# Patient Record
Sex: FEMALE | Race: WHITE
[De-identification: names, ages, dates, MRNs, and addresses within clinical notes are randomized per-mention and may not be internally consistent; named-entity substitution may affect disease eponyms.]

---

## 2020-10-14 ENCOUNTER — HOSPITAL ENCOUNTER (EMERGENCY)
Age: 45
LOS: 1 days | Discharge: HOME | End: 2020-10-15
Payer: COMMERCIAL

## 2020-10-14 VITALS
DIASTOLIC BLOOD PRESSURE: 44 MMHG | TEMPERATURE: 101.66 F | RESPIRATION RATE: 21 BRPM | HEART RATE: 150 BPM | SYSTOLIC BLOOD PRESSURE: 100 MMHG | OXYGEN SATURATION: 100 %

## 2020-10-14 DIAGNOSIS — M54.9: ICD-10-CM

## 2020-10-14 DIAGNOSIS — R30.0: ICD-10-CM

## 2020-10-14 DIAGNOSIS — R39.15: ICD-10-CM

## 2020-10-14 DIAGNOSIS — R50.9: ICD-10-CM

## 2020-10-14 DIAGNOSIS — N12: Primary | ICD-10-CM

## 2020-10-14 DIAGNOSIS — R00.0: ICD-10-CM

## 2020-10-14 LAB
ADD MANUAL DIFF / SLIDE REVIEW: NO
ALBUMIN SERPL-MCNC: 4.5 G/DL (ref 3.5–5)
ALBUMIN/GLOB SERPL: 1.4 {RATIO} (ref 1–2.8)
ALP SERPL-CCNC: 59 U/L (ref 38–126)
ALT SERPL-CCNC: 82 IU/L (ref ?–35)
BILIRUBIN URINE UA: NEGATIVE
BUN SERPL-MCNC: 11 MG/DL (ref 7–17)
CALCIUM SERPL-MCNC: 9.3 MG/DL (ref 8.4–10.2)
CHLORIDE SERPL-SCNC: 105 MMOL/L (ref 98–107)
CO2 SERPL-SCNC: 26 MMOL/L (ref 22–32)
COLOR UR: YELLOW
ESTIMATED GLOMERULAR FILT RATE: > 60 ML/MIN (ref 60–?)
GLOBULIN SER CALC-MCNC: 3.3 G/DL (ref 1.7–4.1)
GLUCOSE SERPL-MCNC: 113 MG/DL (ref 70–100)
GLUCOSE URINE UA: NEGATIVE G/DL
HEMATOCRIT: 38 % (ref 36–46)
HEMOGLOBIN: 13 G/DL (ref 12–16)
HEMOLYSIS: < 15 (ref 0–50)
HGB UR QL: (no result)
KETONES URINE UA: NEGATIVE
LACTATE SERPL-MCNC: 1.1 MMOL/L (ref 0.7–2.1)
LEUKOCYTE ESTERASE URINE UA: (no result)
LIPASE SERPL-CCNC: 94 U/L (ref 23–300)
LYMPHOCYTES # SPEC AUTO: 1600 /UL (ref 1100–4500)
MCV RBC: 91.6 FL (ref 80–100)
MEAN CORPUSCULAR HEMOGLOBIN: 31.4 PG (ref 26–34)
MEAN CORPUSCULAR HGB CONC: 34.3 % (ref 30–36)
NITRITE URINE UA: NEGATIVE
PH UR: 5 [PH] (ref 4.5–8)
PLATELET COUNT: 183 X10^3/UL (ref 150–400)
POTASSIUM SERPL-SCNC: 3.7 MMOL/L (ref 3.4–5.1)
PROT SERPL-MCNC: 7.8 G/DL (ref 6.3–8.2)
PROTEIN URINE UA: (no result)
SODIUM SERPL-SCNC: 138 MMOL/L (ref 137–145)
SP GR UR: 1.02 (ref 1–1.03)
URINE COMMENTS: (no result)
UROBILINOGEN UR QL: 0.2 E.U./DL

## 2020-10-14 PROCEDURE — 99284 EMERGENCY DEPT VISIT MOD MDM: CPT

## 2020-10-14 PROCEDURE — 80053 COMPREHEN METABOLIC PANEL: CPT

## 2020-10-14 PROCEDURE — 96365 THER/PROPH/DIAG IV INF INIT: CPT

## 2020-10-14 PROCEDURE — 36415 COLL VENOUS BLD VENIPUNCTURE: CPT

## 2020-10-14 PROCEDURE — 87086 URINE CULTURE/COLONY COUNT: CPT

## 2020-10-14 PROCEDURE — 81001 URINALYSIS AUTO W/SCOPE: CPT

## 2020-10-14 PROCEDURE — 87186 SC STD MICRODIL/AGAR DIL: CPT

## 2020-10-14 PROCEDURE — 81025 URINE PREGNANCY TEST: CPT

## 2020-10-14 PROCEDURE — 83690 ASSAY OF LIPASE: CPT

## 2020-10-14 PROCEDURE — 87040 BLOOD CULTURE FOR BACTERIA: CPT

## 2020-10-14 PROCEDURE — 84145 PROCALCITONIN (PCT): CPT

## 2020-10-14 PROCEDURE — 83605 ASSAY OF LACTIC ACID: CPT

## 2020-10-14 PROCEDURE — 96361 HYDRATE IV INFUSION ADD-ON: CPT

## 2020-10-14 PROCEDURE — 85025 COMPLETE CBC W/AUTO DIFF WBC: CPT

## 2020-10-14 PROCEDURE — 93005 ELECTROCARDIOGRAM TRACING: CPT

## 2020-10-14 PROCEDURE — 87077 CULTURE AEROBIC IDENTIFY: CPT

## 2020-10-14 NOTE — ED_ITS
"HPI - General Adult    
General    
Chief complaint: Fever    
Stated complaint: had UTI, back pain, chills    
Time Seen by Provider: 10/14/20 23:17    
Source: patient    
Mode of arrival: Ambulatory    
Limitations: no limitations    
History of Present Illness    
HPI narrative: 45-year-old female who has had UTI like symptoms for   
approximately 10 days here for evaluation of just several hours of a fairly   
sudden onset of chills and body aches and shaking.  She continues to have   
dysuria.  She states she has had symptoms like this in the past been normal she   
self medicates at home with over-the-counter medicines and her symptoms seemed   
to improve.  That has not been happening this time.  He also has right-sided   
back pain.  She did take some Tylenol and ibuprofen and feels that the back pain  
has improved somewhat.  Her chills have improved somewhat but she still feels   
fevers.  She denies any bowel symptoms or vaginal symptoms.  No nausea or   
vomiting.    
Related Data    
                                  Previous Rx's    
    
    
    
 Medication  Instructions  Recorded    
     
ondansetron 4 mg PO Q6H PRN #10 tab 10/15/20    
     
sulfamethoxazole-trimethoprim 1 tab PO BID 14 Days #28 tab 10/15/20    
    
[Bactrim DS]      
    
    
    
                                    Allergies    
    
    
    
Allergy/AdvReac Type Severity Reaction Status Date / Time    
     
No Known Drug Allergies Allergy   Verified 10/14/20 23:46    
    
    
    
    
Review of Systems    
Constitutional    
Constitutional: Reports chills, Reports fever(s) and Reports malaise    
Cardiovascular    
Cardiovascular: Denies chest pain and Denies dyspnea    
Respiratory    
Respiratory: Denies dyspnea    
Gastrointestinal    
Gastrointestinal: Denies abdominal pain, Denies nausea and Denies vomiting    
Genitourinary    
Genitourinary: Reports dysuria, Reports urinary hesitancy and Reports urinary   
urgency    
Genitourinary: Reports dysuria, Reports urinary hesitancy, Reports urinary   
urgency and Denies vaginal discharge    
Musculoskeletal    
Musculoskeletal: Denies arthralgias and Denies myalgias    
Integumentary/Breasts    
Skin/Breast: Denies rash    
Neurologic    
Neurologic: Denies behavioral changes    
Psychiatric    
Psychiatric: Denies behavioral changes    
Hematologic/Lymphatic    
Hematologic/Lymphatic: Denies easy bleeding and Denies easy bruising    
Allergic/Immunologic    
Allergic/Immunologic: Denies urticaria    
    
Patient History    
Medical History (Reviewed 10/15/20 @ 01:25 by En Lunsford DO)    
    
Healthy adult (Acute)    
    
    
Social History (Reviewed 10/15/20 @ 01:25 by En Lunsford DO)    
Smoking Status:  Current every day smoker     
    
    
Smoking Status: Current every day smoker    
alcohol intake frequency: a few times a month    
Substance Use Type: does not use    
    
Exam    
Initial Vital Signs    
Initial Vital Signs: Vital Signs    
    
    
    
Temperature  101.7 F H  10/14/20 23:15    
     
Pulse Rate  150 H  10/14/20 23:15    
     
Respiratory Rate  21   10/14/20 23:15    
     
Blood Pressure  100/44 L  10/14/20 23:15    
     
Pulse Oximetry  100   10/14/20 23:15    
    
    
    
Const    
General: cooperative and comfortable    
Limitations: mental status not altered    
HENMT    
Head: normal to inspection and normocephalic    
Resp    
Effort & Inspection: normal respiratory effort    
Auscultation: clear to auscultation bilaterally    
Cardio    
Rate: tachycardic    
Rhythm: regular rhythm    
Pulses: radial pulses present    
GI    
Inspection: non-distended    
Palpation: soft    
Back/Spine/Pelvis    
Back: CVA tenderness right    
Skin    
Lesions: no lesions    
Rashes: no rashes    
Neuro    
General: patient alert, patient awake and patient oriented x3    
Cognition: normal cognition    
Speech: speech 
[id number]|2020-10-14 23:30:57|2020-10-14 23:30:57|ED.GENADULT||||"HPI - General Adult

## 2020-10-14 NOTE — PC.NURSE
Patient reports noticing burning, urgency and frequency about on week ago. Thought she was on the mend but began to have flank pain which was manageable. Today started getting rigors/chills and fever.

## 2020-10-15 VITALS — DIASTOLIC BLOOD PRESSURE: 53 MMHG | HEART RATE: 110 BPM | OXYGEN SATURATION: 99 % | SYSTOLIC BLOOD PRESSURE: 109 MMHG

## 2020-10-15 VITALS
OXYGEN SATURATION: 99 % | DIASTOLIC BLOOD PRESSURE: 55 MMHG | HEART RATE: 111 BPM | TEMPERATURE: 99.3 F | SYSTOLIC BLOOD PRESSURE: 111 MMHG

## 2020-10-15 VITALS — TEMPERATURE: 99.32 F

## 2020-10-15 LAB — PROCALCITONIN: 0.06 NG/ML (ref ?–0.5)

## 2021-11-17 ENCOUNTER — HOSPITAL ENCOUNTER (OUTPATIENT)
Age: 46
End: 2021-11-17
Payer: COMMERCIAL

## 2021-11-17 DIAGNOSIS — Z34.81: Primary | ICD-10-CM

## 2021-11-17 DIAGNOSIS — Z3A.08: ICD-10-CM

## 2021-11-17 PROCEDURE — 76801 OB US < 14 WKS SINGLE FETUS: CPT

## 2021-11-17 PROCEDURE — 76817 TRANSVAGINAL US OBSTETRIC: CPT

## 2021-12-04 ENCOUNTER — HOSPITAL ENCOUNTER (EMERGENCY)
Age: 46
Discharge: HOME | End: 2021-12-04
Payer: COMMERCIAL

## 2021-12-04 VITALS
SYSTOLIC BLOOD PRESSURE: 113 MMHG | DIASTOLIC BLOOD PRESSURE: 48 MMHG | OXYGEN SATURATION: 99 % | RESPIRATION RATE: 16 BRPM | HEART RATE: 82 BPM

## 2021-12-04 VITALS
HEART RATE: 82 BPM | RESPIRATION RATE: 18 BRPM | DIASTOLIC BLOOD PRESSURE: 58 MMHG | SYSTOLIC BLOOD PRESSURE: 127 MMHG | OXYGEN SATURATION: 98 %

## 2021-12-04 VITALS
HEART RATE: 85 BPM | DIASTOLIC BLOOD PRESSURE: 59 MMHG | SYSTOLIC BLOOD PRESSURE: 126 MMHG | RESPIRATION RATE: 14 BRPM | OXYGEN SATURATION: 100 % | TEMPERATURE: 97.6 F

## 2021-12-04 VITALS — BODY MASS INDEX: 22 KG/M2

## 2021-12-04 VITALS — TEMPERATURE: 97.88 F

## 2021-12-04 DIAGNOSIS — O02.1: Primary | ICD-10-CM

## 2021-12-04 DIAGNOSIS — Z3A.11: ICD-10-CM

## 2021-12-04 LAB
ADD MANUAL DIFF / SLIDE REVIEW: NO
ALBUMIN SERPL-MCNC: 4.4 G/DL (ref 3.5–5)
ALBUMIN/GLOB SERPL: 1.6 {RATIO} (ref 1–2.8)
ALP SERPL-CCNC: 35 U/L (ref 38–126)
ALT SERPL-CCNC: 23 IU/L (ref ?–35)
B-HCG SERPL-ACNC: 3030 MIU/ML
BUN SERPL-MCNC: 8 MG/DL (ref 7–17)
CALCIUM SERPL-MCNC: 9.4 MG/DL (ref 8.4–10.2)
CHLORIDE SERPL-SCNC: 108 MMOL/L (ref 98–107)
CO2 SERPL-SCNC: 25 MMOL/L (ref 22–32)
ESTIMATED GLOMERULAR FILT RATE: > 60 ML/MIN (ref 60–?)
GLOBULIN SER CALC-MCNC: 2.7 G/DL (ref 1.7–4.1)
GLUCOSE SERPL-MCNC: 97 MG/DL (ref 70–100)
HEMATOCRIT: 35.6 % (ref 36–46)
HEMATOCRIT: 36 % (ref 36–46)
HEMOGLOBIN: 12.4 G/DL (ref 12–16)
HEMOGLOBIN: 12.7 G/DL (ref 12–16)
HEMOLYSIS: < 15 (ref 0–50)
LYMPHOCYTES # SPEC AUTO: 2600 /UL (ref 1100–4500)
MCV RBC: 90.4 FL (ref 80–100)
MEAN CORPUSCULAR HEMOGLOBIN: 31.9 PG (ref 26–34)
MEAN CORPUSCULAR HGB CONC: 35.3 % (ref 30–36)
PLATELET COUNT: 178 X10^3/UL (ref 150–400)
POTASSIUM SERPL-SCNC: 3.4 MMOL/L (ref 3.4–5.1)
PROT SERPL-MCNC: 7.1 G/DL (ref 6.3–8.2)
SODIUM SERPL-SCNC: 141 MMOL/L (ref 137–145)

## 2021-12-04 PROCEDURE — 36415 COLL VENOUS BLD VENIPUNCTURE: CPT

## 2021-12-04 PROCEDURE — 96372 THER/PROPH/DIAG INJ SC/IM: CPT

## 2021-12-04 PROCEDURE — 99284 EMERGENCY DEPT VISIT MOD MDM: CPT

## 2021-12-04 PROCEDURE — 76801 OB US < 14 WKS SINGLE FETUS: CPT

## 2021-12-04 PROCEDURE — 86901 BLOOD TYPING SEROLOGIC RH(D): CPT

## 2021-12-04 PROCEDURE — 85018 HEMOGLOBIN: CPT

## 2021-12-04 PROCEDURE — 81015 MICROSCOPIC EXAM OF URINE: CPT

## 2021-12-04 PROCEDURE — 85025 COMPLETE CBC W/AUTO DIFF WBC: CPT

## 2021-12-04 PROCEDURE — 85014 HEMATOCRIT: CPT

## 2021-12-04 PROCEDURE — S0191 MISOPROSTOL, ORAL, 200 MCG: HCPCS

## 2021-12-04 PROCEDURE — 76830 TRANSVAGINAL US NON-OB: CPT

## 2021-12-04 PROCEDURE — 81003 URINALYSIS AUTO W/O SCOPE: CPT

## 2021-12-04 PROCEDURE — 86900 BLOOD TYPING SEROLOGIC ABO: CPT

## 2021-12-04 PROCEDURE — 80053 COMPREHEN METABOLIC PANEL: CPT

## 2021-12-04 PROCEDURE — 84702 CHORIONIC GONADOTROPIN TEST: CPT

## 2021-12-05 ENCOUNTER — HOSPITAL ENCOUNTER (OUTPATIENT)
Dept: HOSPITAL 73 - ED | Age: 46
Setting detail: OBSERVATION
Discharge: HOME | End: 2021-12-05
Payer: COMMERCIAL

## 2021-12-05 VITALS — OXYGEN SATURATION: 100 % | DIASTOLIC BLOOD PRESSURE: 56 MMHG | HEART RATE: 96 BPM | SYSTOLIC BLOOD PRESSURE: 113 MMHG

## 2021-12-05 VITALS
DIASTOLIC BLOOD PRESSURE: 46 MMHG | TEMPERATURE: 98.42 F | HEART RATE: 83 BPM | RESPIRATION RATE: 14 BRPM | SYSTOLIC BLOOD PRESSURE: 100 MMHG | OXYGEN SATURATION: 100 %

## 2021-12-05 VITALS
TEMPERATURE: 98.42 F | RESPIRATION RATE: 12 BRPM | OXYGEN SATURATION: 100 % | SYSTOLIC BLOOD PRESSURE: 101 MMHG | DIASTOLIC BLOOD PRESSURE: 47 MMHG | HEART RATE: 83 BPM

## 2021-12-05 VITALS — HEART RATE: 79 BPM | OXYGEN SATURATION: 100 % | DIASTOLIC BLOOD PRESSURE: 52 MMHG | SYSTOLIC BLOOD PRESSURE: 88 MMHG

## 2021-12-05 VITALS
OXYGEN SATURATION: 100 % | DIASTOLIC BLOOD PRESSURE: 52 MMHG | HEART RATE: 86 BPM | RESPIRATION RATE: 16 BRPM | SYSTOLIC BLOOD PRESSURE: 102 MMHG

## 2021-12-05 VITALS — HEART RATE: 96 BPM | OXYGEN SATURATION: 100 %

## 2021-12-05 VITALS
OXYGEN SATURATION: 100 % | RESPIRATION RATE: 16 BRPM | HEART RATE: 94 BPM | DIASTOLIC BLOOD PRESSURE: 47 MMHG | TEMPERATURE: 97.6 F | SYSTOLIC BLOOD PRESSURE: 91 MMHG

## 2021-12-05 VITALS — SYSTOLIC BLOOD PRESSURE: 112 MMHG | HEART RATE: 96 BPM | OXYGEN SATURATION: 100 % | DIASTOLIC BLOOD PRESSURE: 57 MMHG

## 2021-12-05 VITALS
DIASTOLIC BLOOD PRESSURE: 57 MMHG | OXYGEN SATURATION: 100 % | RESPIRATION RATE: 14 BRPM | SYSTOLIC BLOOD PRESSURE: 103 MMHG | HEART RATE: 97 BPM

## 2021-12-05 VITALS — HEART RATE: 74 BPM | OXYGEN SATURATION: 100 % | SYSTOLIC BLOOD PRESSURE: 93 MMHG | DIASTOLIC BLOOD PRESSURE: 55 MMHG

## 2021-12-05 VITALS — OXYGEN SATURATION: 100 % | HEART RATE: 84 BPM

## 2021-12-05 VITALS
HEART RATE: 101 BPM | OXYGEN SATURATION: 99 % | TEMPERATURE: 97.34 F | DIASTOLIC BLOOD PRESSURE: 46 MMHG | SYSTOLIC BLOOD PRESSURE: 97 MMHG | RESPIRATION RATE: 18 BRPM

## 2021-12-05 VITALS — HEART RATE: 84 BPM | OXYGEN SATURATION: 100 % | SYSTOLIC BLOOD PRESSURE: 84 MMHG | DIASTOLIC BLOOD PRESSURE: 47 MMHG

## 2021-12-05 VITALS — SYSTOLIC BLOOD PRESSURE: 112 MMHG | HEART RATE: 92 BPM | OXYGEN SATURATION: 100 % | DIASTOLIC BLOOD PRESSURE: 56 MMHG

## 2021-12-05 VITALS
SYSTOLIC BLOOD PRESSURE: 95 MMHG | TEMPERATURE: 97.7 F | RESPIRATION RATE: 17 BRPM | OXYGEN SATURATION: 100 % | DIASTOLIC BLOOD PRESSURE: 49 MMHG | HEART RATE: 86 BPM

## 2021-12-05 VITALS — DIASTOLIC BLOOD PRESSURE: 58 MMHG | HEART RATE: 86 BPM | OXYGEN SATURATION: 100 % | SYSTOLIC BLOOD PRESSURE: 109 MMHG

## 2021-12-05 VITALS
SYSTOLIC BLOOD PRESSURE: 94 MMHG | OXYGEN SATURATION: 100 % | RESPIRATION RATE: 14 BRPM | DIASTOLIC BLOOD PRESSURE: 47 MMHG | HEART RATE: 98 BPM

## 2021-12-05 VITALS — SYSTOLIC BLOOD PRESSURE: 97 MMHG | DIASTOLIC BLOOD PRESSURE: 52 MMHG | HEART RATE: 82 BPM | OXYGEN SATURATION: 100 %

## 2021-12-05 VITALS — OXYGEN SATURATION: 100 % | DIASTOLIC BLOOD PRESSURE: 51 MMHG | SYSTOLIC BLOOD PRESSURE: 99 MMHG | HEART RATE: 74 BPM

## 2021-12-05 VITALS — DIASTOLIC BLOOD PRESSURE: 66 MMHG | HEART RATE: 90 BPM | OXYGEN SATURATION: 100 % | SYSTOLIC BLOOD PRESSURE: 120 MMHG

## 2021-12-05 VITALS — BODY MASS INDEX: 22 KG/M2 | BODY MASS INDEX: 22.1 KG/M2

## 2021-12-05 VITALS — DIASTOLIC BLOOD PRESSURE: 49 MMHG | HEART RATE: 90 BPM | SYSTOLIC BLOOD PRESSURE: 95 MMHG | OXYGEN SATURATION: 100 %

## 2021-12-05 VITALS — SYSTOLIC BLOOD PRESSURE: 113 MMHG | DIASTOLIC BLOOD PRESSURE: 53 MMHG | HEART RATE: 86 BPM | OXYGEN SATURATION: 100 %

## 2021-12-05 VITALS — HEART RATE: 97 BPM | DIASTOLIC BLOOD PRESSURE: 55 MMHG | OXYGEN SATURATION: 100 % | SYSTOLIC BLOOD PRESSURE: 109 MMHG

## 2021-12-05 VITALS — OXYGEN SATURATION: 100 % | SYSTOLIC BLOOD PRESSURE: 89 MMHG | HEART RATE: 78 BPM | DIASTOLIC BLOOD PRESSURE: 55 MMHG

## 2021-12-05 DIAGNOSIS — Z3A.11: ICD-10-CM

## 2021-12-05 DIAGNOSIS — O03.1: Primary | ICD-10-CM

## 2021-12-05 DIAGNOSIS — Z20.822: ICD-10-CM

## 2021-12-05 DIAGNOSIS — D62: ICD-10-CM

## 2021-12-05 DIAGNOSIS — N93.9: ICD-10-CM

## 2021-12-05 LAB
HEMATOCRIT: 26.9 % (ref 36–46)
HEMATOCRIT: 29.2 % (ref 36–46)
HEMOGLOBIN: 9.4 G/DL (ref 12–16)
HEMOGLOBIN: 9.9 G/DL (ref 12–16)

## 2021-12-05 PROCEDURE — 96365 THER/PROPH/DIAG IV INF INIT: CPT

## 2021-12-05 PROCEDURE — 85018 HEMOGLOBIN: CPT

## 2021-12-05 PROCEDURE — 86870 RBC ANTIBODY IDENTIFICATION: CPT

## 2021-12-05 PROCEDURE — 96361 HYDRATE IV INFUSION ADD-ON: CPT

## 2021-12-05 PROCEDURE — 86850 RBC ANTIBODY SCREEN: CPT

## 2021-12-05 PROCEDURE — 36415 COLL VENOUS BLD VENIPUNCTURE: CPT

## 2021-12-05 PROCEDURE — G0378 HOSPITAL OBSERVATION PER HR: HCPCS

## 2021-12-05 PROCEDURE — 76830 TRANSVAGINAL US NON-OB: CPT

## 2021-12-05 PROCEDURE — 86900 BLOOD TYPING SEROLOGIC ABO: CPT

## 2021-12-05 PROCEDURE — 99284 EMERGENCY DEPT VISIT MOD MDM: CPT

## 2021-12-05 PROCEDURE — 87635 SARS-COV-2 COVID-19 AMP PRB: CPT

## 2021-12-05 PROCEDURE — 86901 BLOOD TYPING SEROLOGIC RH(D): CPT

## 2021-12-05 PROCEDURE — 59812 TREATMENT OF MISCARRIAGE: CPT

## 2021-12-05 PROCEDURE — 96372 THER/PROPH/DIAG INJ SC/IM: CPT

## 2021-12-05 PROCEDURE — 99285 EMERGENCY DEPT VISIT HI MDM: CPT

## 2021-12-05 PROCEDURE — 76856 US EXAM PELVIC COMPLETE: CPT

## 2021-12-05 PROCEDURE — 85014 HEMATOCRIT: CPT

## 2021-12-05 PROCEDURE — 96375 TX/PRO/DX INJ NEW DRUG ADDON: CPT

## 2021-12-05 RX ADMIN — METHYLERGONOVINE MALEATE 0.2 MG: 0.2 INJECTION, SOLUTION INTRAMUSCULAR; INTRAVENOUS at 03:34

## 2021-12-05 RX ADMIN — SODIUM CHLORIDE, SODIUM LACTATE, POTASSIUM CHLORIDE, AND CALCIUM CHLORIDE 100 ML: .6; .31; .03; .02 INJECTION, SOLUTION INTRAVENOUS at 05:13

## 2021-12-05 RX ADMIN — CEFAZOLIN SODIUM 2 GM: 2 INJECTION, SOLUTION INTRAVENOUS at 05:41

## 2021-12-05 RX ADMIN — SODIUM CHLORIDE 200 ML: 9 INJECTION, SOLUTION INTRAVENOUS at 03:23

## 2021-12-05 RX ADMIN — SODIUM CHLORIDE 125 ML: 0.9 INJECTION, SOLUTION INTRAVENOUS at 02:30

## 2021-12-05 RX ADMIN — OXYTOCIN 10 UNIT: 10 INJECTION, SOLUTION INTRAMUSCULAR; INTRAVENOUS at 03:34

## 2023-09-25 ENCOUNTER — HOSPITAL ENCOUNTER (EMERGENCY)
Age: 48
Discharge: HOME | End: 2023-09-25
Payer: COMMERCIAL

## 2023-09-25 VITALS
RESPIRATION RATE: 14 BRPM | DIASTOLIC BLOOD PRESSURE: 73 MMHG | TEMPERATURE: 98.24 F | SYSTOLIC BLOOD PRESSURE: 133 MMHG | HEART RATE: 90 BPM | OXYGEN SATURATION: 100 %

## 2023-09-25 VITALS — BODY MASS INDEX: 22.8 KG/M2

## 2023-09-25 DIAGNOSIS — K04.7: Primary | ICD-10-CM

## 2023-09-25 PROCEDURE — 99283 EMERGENCY DEPT VISIT LOW MDM: CPT

## 2023-09-25 PROCEDURE — 10060 I&D ABSCESS SIMPLE/SINGLE: CPT

## 2023-09-25 PROCEDURE — 99281 EMR DPT VST MAYX REQ PHY/QHP: CPT
